# Patient Record
Sex: FEMALE | Race: WHITE | Employment: UNEMPLOYED | URBAN - METROPOLITAN AREA
[De-identification: names, ages, dates, MRNs, and addresses within clinical notes are randomized per-mention and may not be internally consistent; named-entity substitution may affect disease eponyms.]

---

## 2017-02-07 ENCOUNTER — APPOINTMENT (OUTPATIENT)
Dept: CT IMAGING | Age: 42
End: 2017-02-07
Attending: EMERGENCY MEDICINE
Payer: SELF-PAY

## 2017-02-07 ENCOUNTER — APPOINTMENT (OUTPATIENT)
Dept: GENERAL RADIOLOGY | Age: 42
End: 2017-02-07
Attending: EMERGENCY MEDICINE
Payer: SELF-PAY

## 2017-02-07 ENCOUNTER — HOSPITAL ENCOUNTER (EMERGENCY)
Age: 42
Discharge: HOME | End: 2017-02-07
Attending: EMERGENCY MEDICINE
Payer: SELF-PAY

## 2017-02-07 VITALS
DIASTOLIC BLOOD PRESSURE: 81 MMHG | SYSTOLIC BLOOD PRESSURE: 116 MMHG | BODY MASS INDEX: 25.11 KG/M2 | WEIGHT: 160 LBS | TEMPERATURE: 98.2 F | HEIGHT: 67 IN | HEART RATE: 85 BPM | OXYGEN SATURATION: 98 % | RESPIRATION RATE: 15 BRPM

## 2017-02-07 LAB
ALBUMIN SERPL BCP-MCNC: 3 G/DL (ref 3.5–5)
ALBUMIN/GLOB SERPL: 0.7 {RATIO} (ref 1.1–2.2)
ALP SERPL-CCNC: 102 U/L (ref 45–117)
ALT SERPL-CCNC: 81 U/L (ref 12–78)
ANION GAP BLD CALC-SCNC: 9 MMOL/L (ref 5–15)
AST SERPL W P-5'-P-CCNC: 57 U/L (ref 15–37)
BASOPHILS # BLD AUTO: 0 K/UL (ref 0–0.1)
BASOPHILS # BLD: 1 % (ref 0–1)
BILIRUB SERPL-MCNC: 0.3 MG/DL (ref 0.2–1)
BUN SERPL-MCNC: 11 MG/DL (ref 6–20)
BUN/CREAT SERPL: 12 (ref 12–20)
CALCIUM SERPL-MCNC: 8.4 MG/DL (ref 8.5–10.1)
CHLORIDE SERPL-SCNC: 103 MMOL/L (ref 97–108)
CO2 SERPL-SCNC: 24 MMOL/L (ref 21–32)
CREAT SERPL-MCNC: 0.89 MG/DL (ref 0.55–1.02)
EOSINOPHIL # BLD: 0.1 K/UL (ref 0–0.4)
EOSINOPHIL NFR BLD: 2 % (ref 0–7)
ERYTHROCYTE [DISTWIDTH] IN BLOOD BY AUTOMATED COUNT: 12.9 % (ref 11.5–14.5)
GLOBULIN SER CALC-MCNC: 4.1 G/DL (ref 2–4)
GLUCOSE SERPL-MCNC: 189 MG/DL (ref 65–100)
HCT VFR BLD AUTO: 37.8 % (ref 35–47)
HGB BLD-MCNC: 12.7 G/DL (ref 11.5–16)
LYMPHOCYTES # BLD AUTO: 12 % (ref 12–49)
LYMPHOCYTES # BLD: 0.9 K/UL (ref 0.8–3.5)
MCH RBC QN AUTO: 29.6 PG (ref 26–34)
MCHC RBC AUTO-ENTMCNC: 33.6 G/DL (ref 30–36.5)
MCV RBC AUTO: 88.1 FL (ref 80–99)
MONOCYTES # BLD: 0.2 K/UL (ref 0–1)
MONOCYTES NFR BLD AUTO: 3 % (ref 5–13)
NEUTS SEG # BLD: 5.9 K/UL (ref 1.8–8)
NEUTS SEG NFR BLD AUTO: 82 % (ref 32–75)
PLATELET # BLD AUTO: 207 K/UL (ref 150–400)
POTASSIUM SERPL-SCNC: 3.7 MMOL/L (ref 3.5–5.1)
PROT SERPL-MCNC: 7.1 G/DL (ref 6.4–8.2)
RBC # BLD AUTO: 4.29 M/UL (ref 3.8–5.2)
SODIUM SERPL-SCNC: 136 MMOL/L (ref 136–145)
TROPONIN I SERPL-MCNC: <0.04 NG/ML
WBC # BLD AUTO: 7.2 K/UL (ref 3.6–11)

## 2017-02-07 PROCEDURE — 80053 COMPREHEN METABOLIC PANEL: CPT | Performed by: EMERGENCY MEDICINE

## 2017-02-07 PROCEDURE — 93005 ELECTROCARDIOGRAM TRACING: CPT

## 2017-02-07 PROCEDURE — 36415 COLL VENOUS BLD VENIPUNCTURE: CPT | Performed by: EMERGENCY MEDICINE

## 2017-02-07 PROCEDURE — 85025 COMPLETE CBC W/AUTO DIFF WBC: CPT | Performed by: EMERGENCY MEDICINE

## 2017-02-07 PROCEDURE — 99285 EMERGENCY DEPT VISIT HI MDM: CPT

## 2017-02-07 PROCEDURE — 71020 XR CHEST PA LAT: CPT

## 2017-02-07 PROCEDURE — 84484 ASSAY OF TROPONIN QUANT: CPT | Performed by: EMERGENCY MEDICINE

## 2017-02-07 PROCEDURE — 74011250637 HC RX REV CODE- 250/637: Performed by: EMERGENCY MEDICINE

## 2017-02-07 PROCEDURE — 71250 CT THORAX DX C-: CPT

## 2017-02-07 RX ORDER — IBUPROFEN 400 MG/1
400 TABLET ORAL
Status: COMPLETED | OUTPATIENT
Start: 2017-02-07 | End: 2017-02-07

## 2017-02-07 RX ORDER — NALOXONE HYDROCHLORIDE 1 MG/ML
2 INJECTION INTRAMUSCULAR; INTRAVENOUS; SUBCUTANEOUS
Qty: 2 SYRINGE | Refills: 0 | Status: SHIPPED | OUTPATIENT
Start: 2017-02-07

## 2017-02-07 RX ADMIN — IBUPROFEN 400 MG: 400 TABLET, FILM COATED ORAL at 21:12

## 2017-02-07 NOTE — ED PROVIDER NOTES
HPI Comments: 37 y.o. female with past medical history significant for depression, chronic pain who presents via EMS with chief complaint of drug overdose. Pt reports using IV heroin today (time of last dose unknown), although she expresses that she wants to quit. Per EMS, pt's friend found the pt unresponsive and called 911 and when EMS arrived, the friend and [employer] were doing chest compressions. Pt was breathing at 2/min and vitals increased after receiving IV narcan. Pt arrives to ED A+Ox4. She says that she currently has a headache with anxiety described as \"my nerves are donita high\". Pt denies abdominal pain. There are no other acute medical concerns at this time. Social hx: pt uses IV heroin  PCP: PROVIDER UNKNOWN    Note written by Rowena Alfonso, as dictated by Shade Alamo MD 6:33 PM      The history is provided by the patient. Past Medical History:   Diagnosis Date    Chronic pain     Depression     Depression NOS     Infectious disease      lyme disease    Other ill-defined conditions(293.81)      vit D deficiency, lyme's disease    Thyroid disease        History reviewed. No pertinent past surgical history. History reviewed. No pertinent family history. Social History     Social History    Marital status: SINGLE     Spouse name: N/A    Number of children: N/A    Years of education: N/A     Occupational History    Not on file. Social History Main Topics    Smoking status: Current Every Day Smoker     Packs/day: 1.00    Smokeless tobacco: Not on file    Alcohol use No    Drug use: Yes     Special: Marijuana, Heroin    Sexual activity: Not on file     Other Topics Concern    Not on file     Social History Narrative         ALLERGIES: Amoxicillin and Darvocet a500 [propoxyphene n-acetaminophen]    Review of Systems   Gastrointestinal: Negative for abdominal pain. Neurological: Positive for headaches. Psychiatric/Behavioral: The patient is nervous/anxious. All other systems reviewed and are negative. Vitals:    02/07/17 1812 02/07/17 1945 02/07/17 2030   BP: 123/80 (!) 130/94 113/84   Pulse: 98 98 96   Resp: 11 24 14   Temp: 98.2 °F (36.8 °C)     SpO2: 99% 99% 99%   Weight: 72.6 kg (160 lb)     Height: 5' 7\" (1.702 m)              Physical Exam   Constitutional: She is oriented to person, place, and time. She appears well-developed and well-nourished. No distress. NAD   HENT:   Head: Normocephalic and atraumatic. Eyes: Conjunctivae are normal. No scleral icterus. Neck: Neck supple. No tracheal deviation present. Cardiovascular: Normal rate, regular rhythm, normal heart sounds and intact distal pulses. Exam reveals no gallop and no friction rub. No murmur heard. Pulmonary/Chest: Effort normal and breath sounds normal. She has no wheezes. She has no rales. Clear lungs   Abdominal: Soft. She exhibits no distension. There is no tenderness. There is no rebound and no guarding. Musculoskeletal: She exhibits no edema. Neurological: She is alert and oriented to person, place, and time. Pt A+Ox4   Skin: Skin is warm and dry. No rash noted. Psychiatric: She has a normal mood and affect. Nursing note and vitals reviewed. Note written by Rowena Falcon, as dictated by Kike Scruggs MD 6:33 PM      MDM  Number of Diagnoses or Management Options  Opiate and narcotic poisoning, accidental or unintentional, initial encounter:      Amount and/or Complexity of Data Reviewed  Clinical lab tests: ordered and reviewed  Tests in the radiology section of CPT®: ordered and reviewed  Tests in the medicine section of CPT®: ordered and reviewed      ED Course       Procedures    PROGRESS NOTE:  10:25 PM  Chest CT reveals no rib or sternum fx. It did however reveal small 3mm nodules which will need follow up in one year. Pt was offered admission for overnight observation but she refused.  Pt was observed in the ER for 4 hours and appears in no distress. Will discharge pt home with follow up to PCP.

## 2017-02-07 NOTE — ED TRIAGE NOTES
Pt comes from a friends house, pts friends called EMS for a \"cardiac arrest\" friends and [employer] were doing chest compressions, EMS states that patient had a pulse but was breathing 2/min,EMS gave patient 1mg Narcan IV. Per ems O2 saturations were 50% on RA, pt is currently A&Ox4,per ems patient came to after the Narcan was given. Pt denies pain but states that she is anxious. Pt states that she did Heroin about an hour ago.

## 2017-02-08 LAB
ATRIAL RATE: 102 BPM
CALCULATED P AXIS, ECG09: 53 DEGREES
CALCULATED R AXIS, ECG10: 53 DEGREES
CALCULATED T AXIS, ECG11: 23 DEGREES
DIAGNOSIS, 93000: NORMAL
P-R INTERVAL, ECG05: 184 MS
Q-T INTERVAL, ECG07: 364 MS
QRS DURATION, ECG06: 86 MS
QTC CALCULATION (BEZET), ECG08: 474 MS
VENTRICULAR RATE, ECG03: 102 BPM

## 2017-02-08 NOTE — ED NOTES
Pt is A&OX3 airway patent resting on str without signs of distress. Pt given discharge instructions and a prescription for Narcan. Pt expressed understanding of such information. IV removed and pressure dressing applied. This nurse educated pt and her boyfriend extensively about Heroin and the residual effects of such. Pt expressed understanding of such information, and stated that she was frustrated with other dr's offices. Pt reports that her last PCP fired her as she used marijuana. Pt reports that she has been unable to find a PCP who will prescribe her Xanax as that is, \"the only thing that works for me. \" pt advised to call the number provided to set up primary care.

## 2017-02-08 NOTE — ED NOTES
Pt is A&OX3 airway patent resting on str without signs of distress. PD at bedside with pt. Awaiting dispo at this time.

## 2017-02-08 NOTE — DISCHARGE INSTRUCTIONS
Accidental Overdose of Medicine: Care Instructions  Your Care Instructions  Almost any medicine can cause harm if you take too much of it. You have had treatment to help your body get rid of an overdose of a medicine. This may have been an over-the-counter medicine. Or it might be one that a doctor prescribed. It may even have been a vitamin or a supplement. During treatment, the doctor may have given you fluids and medicine. You also may have had lab tests. Then the doctor made sure that you were well enough to go home. The doctor has checked you carefully, but problems can develop later. If you notice any problems or new symptoms, get medical treatment right away. Follow-up care is a key part of your treatment and safety. Be sure to make and go to all appointments, and call your doctor if you are having problems. It's also a good idea to know your test results and keep a list of the medicines you take. How can you care for yourself at home? Home care  · Drink plenty of fluids. If you have kidney, heart, or liver disease and have to limit fluids, talk with your doctor before you increase the amount of fluids you drink. · If you normally take medicines, ask your doctor when you can start taking them again. · Read the information that comes with any medicine. If you have questions, ask your doctor or pharmacist.  Prevention  · Be safe with medicines. Take your medicines exactly as prescribed or as the label directs. Call your doctor if you think you are having a problem with your medicine. · Keep your medicines in the containers they came in. Keep them with the original labels. · Find out what to do if you miss a dose of your medicine. When should you call for help? Call 911 anytime you think you may need emergency care. For example, call if:  · You passed out (lost consciousness). Call your doctor now or seek immediate medical care if:  · You have a sudden change in behavior.   · You are very confused or can't think clearly. · You have new symptoms, such as vomiting or belly pain. Watch closely for changes in your health, and be sure to contact your doctor if:  · You do not get better as expected. Where can you learn more? Go to http://braydon-rai.info/. Enter C165 in the search box to learn more about \"Accidental Overdose of Medicine: Care Instructions. \"  Current as of: August 14, 2016  Content Version: 11.1  © 8687-9148 [employer]. Care instructions adapted under license by [employer] (which disclaims liability or warranty for this information). If you have questions about a medical condition or this instruction, always ask your healthcare professional. [address] any warranty or liability for your use of this information. Alcohol, Drug, or Poison Ingestion: Care Instructions  Your Care Instructions  A person can become very sick, or die, from swallowing or using alcohol, drugs, or poisons. Alcohol poisoning occurs when a person drinks a large amount of alcohol. Alcohol can stop nerve signals that control breathing. It can also stop the gag reflex that prevents choking. Alcohol poisoning is serious. It can lead to brain damage or death if it's not treated right away. Drugs can be used by accident or on purpose. They can be swallowed, inhaled, injected, or absorbed through the skin. Drugs include over-the-counter medicine (such as aspirin or acetaminophen) and prescription medicine. They also include vitamins and supplements. And they include illegal drugs such as cocaine and heroin. And poisons are all around us. They include household , cosmetics, houseplants, and garden chemicals. The doctor has checked you carefully, but problems can develop later. If you notice any problems or new symptoms, get medical treatment right away. Follow-up care is a key part of your treatment and safety.  Be sure to make and go to all appointments, and call your doctor if you are having problems. It's also a good idea to know your test results and keep a list of the medicines you take. How can you care for yourself at home? Alcohol problems  · Talk to your doctor or counselor about programs that can help you stop using alcohol. · Plan ways to avoid being tempted to drink. ¨ Get rid of all alcohol in your home. ¨ Avoid places where you tend to drink. ¨ Stay away from places or events that offer alcohol. ¨ Stay away from people who drink a lot. Drug problems  · Talk to your doctor about programs that can help you stop using drugs. · Get rid of any drugs you might be tempted to misuse. · Learn how to say no when other people use drugs. · Don't spend time with people who use drugs. Poison prevention  · Keep products in the containers they came in. Keep them with the original labels. · Be careful when you use cleaning products, paints, solvents, and pesticides. Read labels before use. Use a fan to move strong odors and fumes out of your home. · Do not mix cleaning products. Try to use nontoxic . These include vinegar, lemon juice, and baking soda. When should you call for help? Poison control centers, hospitals, or your doctor can give immediate advice in the case of a poisoning. The [facility] [employer] number is [phone]. Have the poison container with you so you can give complete information to the poison control center, such as what the poison or substance is, how much was taken and when. Do not try to make the person vomit. Call 911 anytime you think you may need emergency care. For example, call if you or someone else:  · Has used or currently uses alcohol or drugs and is very confused or can't stay awake. · Has passed out (lost consciousness). · Has severe trouble breathing. · Is having a seizure.   Call your doctor now or seek immediate medical care if you or someone else:  · Has new symptoms, or is not acting normally. Watch closely for changes in your health, and be sure to contact your doctor if:  · You do not get better as expected. · You need help with drug or alcohol problems. · You have problems with depression or other mental health issues. Where can you learn more? Go to http://braydon-rai.info/. Enter Z821 in the search box to learn more about \"Alcohol, Drug, or Poison Ingestion: Care Instructions. \"  Current as of: May 27, 2016  Content Version: 11.1  © 8745-8884 [employer]. Care instructions adapted under license by [employer] (which disclaims liability or warranty for this information). If you have questions about a medical condition or this instruction, always ask your healthcare professional. [address] any warranty or liability for your use of this information. We hope that we have addressed all of your medical concerns. The examination and treatment you received in the Emergency Department were for an emergent problem and were not intended as complete care. It is important that you follow up with your healthcare provider(s) for ongoing care. If your symptoms worsen or do not improve as expected, and you are unable to reach your usual health care provider(s), you should return to the Emergency Department. Today's healthcare is undergoing tremendous change, and patient satisfaction surveys are one of the many tools to assess the quality of medical care. You may receive a survey from the [employer] regarding your experience in the Emergency Department. I hope that your experience has been completely positive, particularly the medical care that I provided. As such, please participate in the survey; anything less than excellent does not meet my expectations or intentions.         [facility] and [facility] participate in nationally recognized quality of care measures. If your blood pressure is greater than 120/80, as reported below, we urge that you seek medical care to address the potential of high blood pressure, commonly known as hypertension. Hypertension can be hereditary or can be caused by certain medical conditions, pain, stress, or \"white coat syndrome. \"       Please make an appointment with your health care provider(s) for follow up of your Emergency Department visit. VITALS:   Patient Vitals for the past 8 hrs:   Temp Pulse Resp BP SpO2   02/07/17 2030 - 96 14 113/84 99 %   02/07/17 1945 - 98 24 (!) 130/94 99 %   02/07/17 1812 98.2 °F (36.8 °C) 98 11 123/80 99 %          Thank you for allowing us to provide you with medical care today. We realize that you have many choices for your emergency care needs. Please choose us in the future for any continued health care needs. Salome Hernandez MD    [address] [place] [address].   Office: [phone]            Recent Results (from the past 24 hour(s))   EKG, 12 LEAD, INITIAL    Collection Time: 02/07/17  6:14 PM   Result Value Ref Range    Ventricular Rate 102 BPM    Atrial Rate 102 BPM    P-R Interval 184 ms    QRS Duration 86 ms    Q-T Interval 364 ms    QTC Calculation (Bezet) 474 ms    Calculated P Axis 53 degrees    Calculated R Axis 53 degrees    Calculated T Axis 23 degrees    Diagnosis       Sinus tachycardia  When compared with ECG of 04-DEC-2016 19:58,  No significant change was found     CBC WITH AUTOMATED DIFF    Collection Time: 02/07/17  6:25 PM   Result Value Ref Range    WBC 7.2 3.6 - 11.0 K/uL    RBC 4.29 3.80 - 5.20 M/uL    HGB 12.7 11.5 - 16.0 g/dL    HCT 37.8 35.0 - 47.0 %    MCV 88.1 80.0 - 99.0 FL    MCH 29.6 26.0 - 34.0 PG    MCHC 33.6 30.0 - 36.5 g/dL    RDW 12.9 11.5 - 14.5 %    PLATELET 632 427 - 437 K/uL    NEUTROPHILS 82 (H) 32 - 75 %    LYMPHOCYTES 12 12 - 49 %    MONOCYTES 3 (L) 5 - 13 %    EOSINOPHILS 2 0 - 7 %    BASOPHILS 1 0 - 1 %    ABS. NEUTROPHILS 5.9 1.8 - 8.0 K/UL    ABS. LYMPHOCYTES 0.9 0.8 - 3.5 K/UL    ABS. MONOCYTES 0.2 0.0 - 1.0 K/UL    ABS. EOSINOPHILS 0.1 0.0 - 0.4 K/UL    ABS. BASOPHILS 0.0 0.0 - 0.1 K/UL   METABOLIC PANEL, COMPREHENSIVE    Collection Time: 02/07/17  6:25 PM   Result Value Ref Range    Sodium 136 136 - 145 mmol/L    Potassium 3.7 3.5 - 5.1 mmol/L    Chloride 103 97 - 108 mmol/L    CO2 24 21 - 32 mmol/L    Anion gap 9 5 - 15 mmol/L    Glucose 189 (H) 65 - 100 mg/dL    BUN 11 6 - 20 MG/DL    Creatinine 0.89 0.55 - 1.02 MG/DL    BUN/Creatinine ratio 12 12 - 20      GFR est AA >60 >60 ml/min/1.73m2    GFR est non-AA >60 >60 ml/min/1.73m2    Calcium 8.4 (L) 8.5 - 10.1 MG/DL    Bilirubin, total 0.3 0.2 - 1.0 MG/DL    ALT (SGPT) 81 (H) 12 - 78 U/L    AST (SGOT) 57 (H) 15 - 37 U/L    Alk. phosphatase 102 45 - 117 U/L    Protein, total 7.1 6.4 - 8.2 g/dL    Albumin 3.0 (L) 3.5 - 5.0 g/dL    Globulin 4.1 (H) 2.0 - 4.0 g/dL    A-G Ratio 0.7 (L) 1.1 - 2.2     TROPONIN I    Collection Time: 02/07/17  6:25 PM   Result Value Ref Range    Troponin-I, Qt. <0.04 <0.05 ng/mL       Xr Chest Pa Lat    Result Date: 2/7/2017  EXAM:  XR CHEST PA LAT INDICATION:   Heroin use today with drug overdose COMPARISON: 10/26/2009. FINDINGS: PA and lateral radiographs of the chest demonstrate clear lungs. The cardiac and mediastinal contours and pulmonary vascularity are normal.  The bones and soft tissues are within normal limits. IMPRESSION: No acute finding     Ct Chest Wo Cont    Result Date: 2/7/2017  INDICATION: Heroin overdose and cardiac arrest COMPARISON: Chest views earlier today at 2047 hours TECHNIQUE:  5 mm axial images were obtained through the chest. Coronal and sagittal reconstructions were generated. CT dose reduction was achieved through use of a standardized protocol tailored for this examination and automatic exposure control for dose modulation.  The absence of intravenous contrast reduces the sensitivity for evaluation of the mediastinum and upper abdominal organs. FINDINGS: THYROID: No nodule. MEDIASTINUM: No mass or lymphadenopathy. MARYAM: No mass or lymphadenopathy. THORACIC AORTA: Not enlarged. MAIN PULMONARY ARTERY: Normal in caliber. TRACHEA/BRONCHI: Patent. ESOPHAGUS: No wall thickening or dilatation. HEART: Cardiac size is within normal limits. Pericardial fluid is trace pre- PLEURA: No effusion or pneumothorax. LUNGS: Interstitial nodules in the right middle lobe are up to 3 mm in diameter. There is dependent atelectasis. INCIDENTALLY IMAGED UPPER ABDOMEN: No focal abnormality. BONES: No sternal fracture or rib fracture. IMPRESSION: Nonspecific right middle lobe interstitial nodules. No lobar pneumonia, pulmonary edema, or fracture. Guidelines by the Fleischner society (Radiology 2005: 598:471-704) suggest that patients with a low risk for lung cancer who have nodules less than or equal to 4 mm in diameter require no follow-up. In patients with a  higher risk, such as smokers, follow-up is recommended in one year. Patients with a known malignancy are at increased risk for metastasis and should receive three month follow-up.        HAVE REPEAT CT IN ONE YEAR FOR NODULES

## 2017-12-24 ENCOUNTER — HOSPITAL ENCOUNTER (EMERGENCY)
Age: 42
Discharge: HOME | End: 2017-12-24
Attending: EMERGENCY MEDICINE
Payer: SELF-PAY

## 2017-12-24 VITALS
HEART RATE: 112 BPM | BODY MASS INDEX: 32.08 KG/M2 | RESPIRATION RATE: 17 BRPM | TEMPERATURE: 97.9 F | OXYGEN SATURATION: 100 % | HEIGHT: 67 IN | WEIGHT: 204.37 LBS | DIASTOLIC BLOOD PRESSURE: 82 MMHG | SYSTOLIC BLOOD PRESSURE: 109 MMHG

## 2017-12-24 DIAGNOSIS — L29.9 PRURITUS: ICD-10-CM

## 2017-12-24 DIAGNOSIS — T78.40XA ALLERGIC REACTION, INITIAL ENCOUNTER: Primary | ICD-10-CM

## 2017-12-24 DIAGNOSIS — F41.8 ANXIETY ASSOCIATED WITH DEPRESSION: ICD-10-CM

## 2017-12-24 PROCEDURE — 74011000250 HC RX REV CODE- 250: Performed by: EMERGENCY MEDICINE

## 2017-12-24 PROCEDURE — 96375 TX/PRO/DX INJ NEW DRUG ADDON: CPT

## 2017-12-24 PROCEDURE — 99282 EMERGENCY DEPT VISIT SF MDM: CPT

## 2017-12-24 PROCEDURE — 74011250636 HC RX REV CODE- 250/636: Performed by: EMERGENCY MEDICINE

## 2017-12-24 PROCEDURE — 96374 THER/PROPH/DIAG INJ IV PUSH: CPT

## 2017-12-24 RX ORDER — TRAZODONE HYDROCHLORIDE 100 MG/1
100 TABLET ORAL
COMMUNITY

## 2017-12-24 RX ORDER — LORAZEPAM 0.5 MG/1
1 TABLET ORAL
Qty: 20 TAB | Refills: 0 | Status: SHIPPED | OUTPATIENT
Start: 2017-12-24

## 2017-12-24 RX ORDER — PREDNISONE 20 MG/1
40 TABLET ORAL DAILY
Qty: 10 TAB | Refills: 0 | Status: SHIPPED | OUTPATIENT
Start: 2017-12-24 | End: 2017-12-29

## 2017-12-24 RX ORDER — GABAPENTIN 300 MG/1
300 CAPSULE ORAL 3 TIMES DAILY
COMMUNITY

## 2017-12-24 RX ORDER — DIPHENHYDRAMINE HYDROCHLORIDE 50 MG/ML
25 INJECTION, SOLUTION INTRAMUSCULAR; INTRAVENOUS
Status: COMPLETED | OUTPATIENT
Start: 2017-12-24 | End: 2017-12-24

## 2017-12-24 RX ORDER — HYDROXYZINE 50 MG/1
50 TABLET, FILM COATED ORAL
Qty: 20 TAB | Refills: 0 | Status: SHIPPED | OUTPATIENT
Start: 2017-12-24 | End: 2018-01-03

## 2017-12-24 RX ORDER — PREDNISONE 20 MG/1
40 TABLET ORAL DAILY
Qty: 10 TAB | Refills: 0 | Status: SHIPPED | OUTPATIENT
Start: 2017-12-24 | End: 2017-12-24

## 2017-12-24 RX ORDER — FAMOTIDINE 10 MG/ML
20 INJECTION INTRAVENOUS
Status: COMPLETED | OUTPATIENT
Start: 2017-12-24 | End: 2017-12-24

## 2017-12-24 RX ADMIN — METHYLPREDNISOLONE SODIUM SUCCINATE 125 MG: 125 INJECTION, POWDER, FOR SOLUTION INTRAMUSCULAR; INTRAVENOUS at 21:29

## 2017-12-24 RX ADMIN — DIPHENHYDRAMINE HYDROCHLORIDE 25 MG: 50 INJECTION, SOLUTION INTRAMUSCULAR; INTRAVENOUS at 21:29

## 2017-12-24 RX ADMIN — FAMOTIDINE 20 MG: 10 INJECTION, SOLUTION INTRAVENOUS at 21:29

## 2017-12-25 NOTE — DISCHARGE INSTRUCTIONS
Allergic Reaction: Care Instructions  Your Care Instructions    An allergic reaction is an excessive response from your immune system to a medicine, chemical, food, insect bite, or other substance. A reaction can range from mild to life-threatening. Some people have a mild rash, hives, and itching or stomach cramps. In severe reactions, swelling of your tongue and throat can close up your airway so that you cannot breathe. Follow-up care is a key part of your treatment and safety. Be sure to make and go to all appointments, and call your doctor if you are having problems. It's also a good idea to know your test results and keep a list of the medicines you take. How can you care for yourself at home? · If you know what caused your allergic reaction, be sure to avoid it. Your allergy may become more severe each time you have a reaction. · Take an over-the-counter antihistamine, such as cetirizine (Zyrtec) or loratadine (Claritin), to treat mild symptoms. Read and follow directions on the label. Some antihistamines can make you feel sleepy. Do not give antihistamines to a child unless you have checked with your doctor first. Mild symptoms include sneezing or an itchy or runny nose; an itchy mouth; a few hives or mild itching; and mild nausea or stomach discomfort. · Do not scratch hives or a rash. Put a cold, moist towel on them or take cool baths to relieve itching. Put ice packs on hives, swelling, or insect stings for 10 to 15 minutes at a time. Put a thin cloth between the ice pack and your skin. Do not take hot baths or showers. They will make the itching worse. · Your doctor may prescribe a shot of epinephrine to carry with you in case you have a severe reaction. Learn how to give yourself the shot and keep it with you at all times. Make sure it is not . · Go to the emergency room every time you have a severe reaction, even if you have used your shot of epinephrine and are feeling better. Symptoms can come back after a shot. · Wear medical alert jewelry that lists your allergies. You can buy this at most drugstores. · If your child has a severe allergy, make sure that his or her teachers, babysitters, coaches, and other caregivers know about the allergy. They should have an epinephrine shot, know how and when to give it, and have a plan to take your child to the hospital.  When should you call for help? Give an epinephrine shot if:  ? · You think you are having a severe allergic reaction. ? · You have symptoms in more than one body area, such as mild nausea and an itchy mouth. ? After giving an epinephrine shot call 911, even if you feel better. ?Call 911 if:  ? · You have symptoms of a severe allergic reaction. These may include:  ¨ Sudden raised, red areas (hives) all over your body. ¨ Swelling of the throat, mouth, lips, or tongue. ¨ Trouble breathing. ¨ Passing out (losing consciousness). Or you may feel very lightheaded or suddenly feel weak, confused, or restless. ? · You have been given an epinephrine shot, even if you feel better. ?Call your doctor now or seek immediate medical care if:  ? · You have symptoms of an allergic reaction, such as:  ¨ A rash or hives (raised, red areas on the skin). ¨ Itching. ¨ Swelling. ¨ Belly pain, nausea, or vomiting. ? Watch closely for changes in your health, and be sure to contact your doctor if:  ? · You do not get better as expected. Where can you learn more? Go to http://braydon-rai.info/. Enter J078 in the search box to learn more about \"Allergic Reaction: Care Instructions. \"  Current as of: September 29, 2016  Content Version: 11.4  © 8867-4675 [employer]. Care instructions adapted under license by [employer] (which disclaims liability or warranty for this information).  If you have questions about a medical condition or this instruction, always ask your healthcare professional. [employer], [facility] disclaims any warranty or liability for your use of this information. Anxiety Disorder: Care Instructions  Your Care Instructions    Anxiety is a normal reaction to stress. Difficult situations can cause you to have symptoms such as sweaty palms and a nervous feeling. In an anxiety disorder, the symptoms are far more severe. Constant worry, muscle tension, trouble sleeping, nausea and diarrhea, and other symptoms can make normal daily activities difficult or impossible. These symptoms may occur for no reason, and they can affect your work, school, or social life. Medicines, counseling, and self-care can all help. Follow-up care is a key part of your treatment and safety. Be sure to make and go to all appointments, and call your doctor if you are having problems. It's also a good idea to know your test results and keep a list of the medicines you take. How can you care for yourself at home? · Take medicines exactly as directed. Call your doctor if you think you are having a problem with your medicine. · Go to your counseling sessions and follow-up appointments. · Recognize and accept your anxiety. Then, when you are in a situation that makes you anxious, say to yourself, \"This is not an emergency. I feel uncomfortable, but I am not in danger. I can keep going even if I feel anxious. \"  · Be kind to your body:  ¨ Relieve tension with exercise or a massage. ¨ Get enough rest.  ¨ Avoid alcohol, caffeine, nicotine, and illegal drugs. They can increase your anxiety level and cause sleep problems. ¨ Learn and do relaxation techniques. See below for more about these techniques. · Engage your mind. Get out and do something you enjoy. Go to a funny movie, or take a walk or hike. Plan your day. Having too much or too little to do can make you anxious. · Keep a record of your symptoms.  Discuss your fears with a good friend or family member, or join a support group for people with similar problems. Talking to others sometimes relieves stress. · Get involved in social groups, or volunteer to help others. Being alone sometimes makes things seem worse than they are. · Get at least 30 minutes of exercise on most days of the week to relieve stress. Walking is a good choice. You also may want to do other activities, such as running, swimming, cycling, or playing tennis or team sports. Relaxation techniques  Do relaxation exercises 10 to 20 minutes a day. You can play soothing, relaxing music while you do them, if you wish. · Tell others in your house that you are going to do your relaxation exercises. Ask them not to disturb you. · Find a comfortable place, away from all distractions and noise. · Lie down on your back, or sit with your back straight. · Focus on your breathing. Make it slow and steady. · Breathe in through your nose. Breathe out through either your nose or mouth. · Breathe deeply, filling up the area between your navel and your rib cage. Breathe so that your belly goes up and down. · Do not hold your breath. · Breathe like this for 5 to 10 minutes. Notice the feeling of calmness throughout your whole body. As you continue to breathe slowly and deeply, relax by doing the following for another 5 to 10 minutes:  · Tighten and relax each muscle group in your body. You can begin at your toes and work your way up to your head. · Imagine your muscle groups relaxing and becoming heavy. · Empty your mind of all thoughts. · Let yourself relax more and more deeply. · Become aware of the state of calmness that surrounds you. · When your relaxation time is over, you can bring yourself back to alertness by moving your fingers and toes and then your hands and feet and then stretching and moving your entire body. Sometimes people fall asleep during relaxation, but they usually wake up shortly afterward.   · Always give yourself time to return to full alertness before you drive a car or do anything that might cause an accident if you are not fully alert. Never play a relaxation tape while you drive a car. When should you call for help? Call 911 anytime you think you may need emergency care. For example, call if:  ? · You feel you cannot stop from hurting yourself or someone else. ? Keep the numbers for these national suicide hotlines: 8-839-216-TALK (2-614.670.2476) and 0-937-CQLEYRG (4-728.410.3289). If you or someone you know talks about suicide or feeling hopeless, get help right away. ? Watch closely for changes in your health, and be sure to contact your doctor if:  ? · You have anxiety or fear that affects your life. ? · You have symptoms of anxiety that are new or different from those you had before. Where can you learn more? Go to http://braydon-rai.info/. Enter P754 in the search box to learn more about \"Anxiety Disorder: Care Instructions. \"  Current as of: May 12, 2017  Content Version: 11.4  © 0989-7310 [employer]. Care instructions adapted under license by [employer] (which disclaims liability or warranty for this information). If you have questions about a medical condition or this instruction, always ask your healthcare professional. [address] any warranty or liability for your use of this information.

## 2017-12-25 NOTE — ED NOTES
Patient requesting to be discharged at this time stating \" I feel so much better now\". ER MD Oneil Rodriguez made aware. Discharge paperwork to follow.

## 2017-12-25 NOTE — ED PROVIDER NOTES
EMERGENCY DEPARTMENT HISTORY AND PHYSICAL EXAM      Date: 12/24/2017  Patient Name: Violet Davenport    History of Presenting Illness     Chief Complaint   Patient presents with    Allergic Reaction     \"i was seen in the ER thursday for an allergic reaction and my whole face was puffy and we couldnt figure out why, they said maybe stress but now i have a rash all over my body and it wont stop itching. \" last self medicated with benadryl 1 hour ago. History Provided By: Patient    HPI: Violet Davenport, 40 y.o. female with PMHx significant for depression, presents ambulatory to the ED with cc of progressively worsening, mild to moderate rash and itching since today. She reports taking Benadryl with mild relief. Pt notes she was evaluated at [facility] 3 days ago for facial swelling and itching. She states she was given IV steroids while there. She notes that her facial swelling has resolved but this morning she woke up with a rash on the rest of her body. She denies any new lotions, soaps, or foods. She denies any recent insect bites. She notes multiple recent stressors. She states she smokes but denies drug use. She denies any SOB, fever or other complaints. PCP: PROVIDER UNKNOWN    There are no other complaints, changes, or physical findings at this time. Current Outpatient Prescriptions   Medication Sig Dispense Refill    traZODone (DESYREL) 100 mg tablet Take 100 mg by mouth nightly.  gabapentin (NEURONTIN) 300 mg capsule Take 300 mg by mouth three (3) times daily.  levothyroxine (SYNTHROID) 175 mcg tablet Take 175 mcg by mouth daily (before breakfast). VERIFIED BY [facility] [id number] AND [facility] [phone]       naloxone (NARCAN) 1 mg/mL injection 2 mL by IntraMUSCular route once as needed for up to 1 dose. 2 Syringe 0    carisoprodol (SOMA) 350 mg tablet Take 350 mg by mouth two (2) times a day.  [facility] [id number] AND [facility] [phone]  ergocalciferol (VITAMIN D) 50,000 unit capsule Take 50,000 Units by mouth every seven (7) days. Patient states she takes on Mondays. VERIFIED BY [facility] [id number] AND [facility] [phone]          Past History     Past Medical History:  Past Medical History:   Diagnosis Date    Chronic pain     Depression     Depression NOS     Fibromyalgia     Infectious disease     lyme disease    Other ill-defined conditions(799.89)     vit D deficiency, lyme's disease    Thyroid disease        Past Surgical History:  History reviewed. No pertinent surgical history. Family History:  History reviewed. No pertinent family history. Social History:  Social History   Substance Use Topics    Smoking status: Current Every Day Smoker     Packs/day: 1.00    Smokeless tobacco: Never Used    Alcohol use No       Allergies: Allergies   Allergen Reactions    Amoxicillin Swelling    Darvocet A500 [Propoxyphene N-Acetaminophen] Hives         Review of Systems   Review of Systems   Constitutional: Negative. Negative for appetite change, chills, fatigue and fever. HENT: Negative. Negative for congestion, rhinorrhea, sinus pressure and sore throat. Eyes: Negative. Respiratory: Negative. Negative for cough, choking, chest tightness, shortness of breath and wheezing. Cardiovascular: Negative. Negative for chest pain, palpitations and leg swelling. Gastrointestinal: Negative for abdominal pain, constipation, diarrhea, nausea and vomiting. Endocrine: Negative. Genitourinary: Negative. Negative for difficulty urinating, dysuria, flank pain and urgency. Musculoskeletal: Negative. Skin: Positive for rash. Neurological: Negative. Negative for dizziness, speech difficulty, weakness, light-headedness, numbness and headaches. Psychiatric/Behavioral: Negative. All other systems reviewed and are negative.     Physical Exam   Physical Exam   Constitutional: She is oriented to person, place, and time. She appears well-developed and well-nourished. No distress. HENT:   Head: Normocephalic and atraumatic. Mouth/Throat: Oropharynx is clear and moist. No oropharyngeal exudate. Eyes: Conjunctivae and EOM are normal. Pupils are equal, round, and reactive to light. Neck: Normal range of motion. Neck supple. No JVD present. No tracheal deviation present. Cardiovascular: Regular rhythm, normal heart sounds and intact distal pulses. No murmur heard. Tachycardic   Pulmonary/Chest: Effort normal and breath sounds normal. No stridor. No respiratory distress. She has no wheezes. She has no rales. She exhibits no tenderness. Abdominal: Soft. She exhibits no distension. There is no tenderness. There is no rebound and no guarding. Musculoskeletal: Normal range of motion. She exhibits no edema or tenderness. Neurological: She is alert and oriented to person, place, and time. No cranial nerve deficit. No gross motor or sensory deficits    Skin: Skin is warm and dry. She is not diaphoretic. Diffuse urticaria   Psychiatric: Her behavior is normal.   Anxious   Nursing note and vitals reviewed. Medical Decision Making   I am the first provider for this patient. I reviewed the vital signs, available nursing notes, past medical history, past surgical history, family history and social history. Vital Signs-Reviewed the patient's vital signs. Patient Vitals for the past 12 hrs:   Temp Pulse Resp BP SpO2   12/24/17 2030 - - - 109/82 100 %   12/24/17 2004 97.9 °F (36.6 °C) (!) 112 17 126/76 100 %     Records Reviewed: Nursing Notes and Old Medical Records    Provider Notes (Medical Decision Making):   DDx: Allergic reaction    ED Course:   Initial assessment performed. The patients presenting problems have been discussed, and they are in agreement with the care plan formulated and outlined with them. I have encouraged them to ask questions as they arise throughout their visit.     11:32 PM  The pt has been re-evaluated. She was informed of the plan for discharge. Return precautions were discussed with the pt and she conveys understanding. She is stable for discharge with follow up as needed. Disposition:  DISCHARGE NOTE  11:32 PM  The patient has been re-evaluated and is ready for discharge. Reviewed available results with patient. Counseled pt on diagnosis and care plan. Pt has expressed understanding, and all questions have been answered. Pt agrees with plan and agrees to follow up as recommended, or return to the ED if their symptoms worsen. Discharge instructions have been provided and explained to the pt, along with reasons to return to the ED. PLAN:  1. Current Discharge Medication List      START taking these medications    Details   hydrOXYzine HCl (ATARAX) 50 mg tablet Take 1 Tab by mouth every six (6) hours as needed for Itching for up to 10 days. Qty: 20 Tab, Refills: 0      LORazepam (ATIVAN) 0.5 mg tablet Take 2 Tabs by mouth every eight (8) hours as needed for Anxiety. Max Daily Amount: 3 mg. Qty: 20 Tab, Refills: 0    Associated Diagnoses: Anxiety associated with depression      predniSONE (DELTASONE) 20 mg tablet Take 2 Tabs by mouth daily for 5 days. With Breakfast  Qty: 10 Tab, Refills: 0           2. Follow-up Information     Follow up With Details Comments Contact Info    Provider Unknown   Patient not available to ask          Return to ED if worse     Diagnosis     Clinical Impression:   1. Allergic reaction, initial encounter    2. Pruritus    3. Anxiety associated with depression          Attestations: This note is prepared by Ovidio Mchugh, acting as Scribe for Abiola Torre, [address], DO: The scribe's documentation has been prepared under my direction and personally reviewed by me in its entirety. I confirm that the note above accurately reflects all work, treatment, procedures, and medical decision making performed by me.